# Patient Record
Sex: FEMALE | Race: WHITE | NOT HISPANIC OR LATINO | Employment: STUDENT | URBAN - METROPOLITAN AREA
[De-identification: names, ages, dates, MRNs, and addresses within clinical notes are randomized per-mention and may not be internally consistent; named-entity substitution may affect disease eponyms.]

---
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
## 2024-02-22 ENCOUNTER — OFFICE VISIT (OUTPATIENT)
Dept: PRIMARY CARE | Facility: CLINIC | Age: 19
End: 2024-02-22
Payer: COMMERCIAL

## 2024-02-22 VITALS
HEART RATE: 76 BPM | OXYGEN SATURATION: 98 % | SYSTOLIC BLOOD PRESSURE: 114 MMHG | DIASTOLIC BLOOD PRESSURE: 62 MMHG | BODY MASS INDEX: 26.55 KG/M2 | WEIGHT: 162 LBS

## 2024-02-22 DIAGNOSIS — K29.70 GASTRITIS WITHOUT BLEEDING, UNSPECIFIED CHRONICITY, UNSPECIFIED GASTRITIS TYPE: ICD-10-CM

## 2024-02-22 DIAGNOSIS — F90.2 ATTENTION DEFICIT HYPERACTIVITY DISORDER (ADHD), COMBINED TYPE: Primary | ICD-10-CM

## 2024-02-22 PROCEDURE — 1036F TOBACCO NON-USER: CPT | Performed by: FAMILY MEDICINE

## 2024-02-22 PROCEDURE — 99213 OFFICE O/P EST LOW 20 MIN: CPT | Performed by: FAMILY MEDICINE

## 2024-02-22 RX ORDER — METHYLPHENIDATE 15 MG/9H
1 PATCH TRANSDERMAL DAILY
Qty: 90 PATCH | Refills: 0 | Status: SHIPPED | OUTPATIENT
Start: 2024-02-22 | End: 2024-05-22

## 2024-02-22 ASSESSMENT — PAIN SCALES - GENERAL: PAINLEVEL: 0-NO PAIN

## 2024-02-22 NOTE — PROGRESS NOTES
Subjective   Patient ID: Gail Bryant is a 18 y.o. female who presents for Abdominal Pain (The pain start on Saturday 02-).    HPI    ABDOMINAL PAIN:  Sat am woke her andfelt like she had to have bowel movement but could not go  No nausea, no vomiting, appettite normal   Happens more often after alcohol intake 0- noticed with beer  She usually drinks alcoholic seltzers but noticed pain did come with weekends where she drank beer.  No fever or chills, no change in bowel movements.    ADD: Denies racing heart, headache, dizziness or nausea.  Sleeping well, no change in pattern or new insomnia  Appetite unchanged - weight stable.   OARRS:  I have personally reviewed the OARRS report for Gail Bryant. I have considered the risks of abuse, dependence, addiction and diversion    Is the patient prescribed a combination of a benzodiazepine and opioid?  No    Last Urine Drug Screen / ordered today: No  No results found for this or any previous visit (from the past 8760 hour(s)).  N/A  Clinical rationale for not completing a Urine Drug Screen:     Controlled Substance Agreement:  Date of the Last Agreement: due now  Reviewed Controlled Substance Agreement including but not limited to the benefits, risks, and alternatives to treatment with a Controlled Substance medication(s).  Reviewed Controlled Substance Agreement including but not limited to the benefits, risk, and alternatives to treatment with a Controlled Substance medication(s)     Stimulants:   What is the patient's goal of therapy? Maintain adequate attention during classes and to complete school work  Is this being achieved with current treatment? yes    Activities of Daily Living:   Is your overall impression that this patient is benefiting (symptom reduction outweighs side effects) from stimulant therapy? Yes     1. Physical Functioning: Better  2. Family Relationship: Better  3. Social Relationship: Better  4. Mood: Better  5. Sleep Patterns: Better  6.  Overall Function: Better        Review of Systems    Review of Systems negative except as noted in HPI and Chief complaint.     Objective                 /62 (BP Location: Left arm, Patient Position: Sitting, BP Cuff Size: Adult)   Pulse 76   Wt 73.5 kg (162 lb)   SpO2 98%   BMI 26.55 kg/m²      Physical Exam  Constitutional:       General: She is not in acute distress.     Appearance: Normal appearance. She is not ill-appearing.   HENT:      Head: Normocephalic and atraumatic.   Neck:      Vascular: No carotid bruit.   Cardiovascular:      Rate and Rhythm: Normal rate and regular rhythm.      Pulses: Normal pulses.      Heart sounds: Normal heart sounds. No murmur heard.     No gallop.   Pulmonary:      Effort: Pulmonary effort is normal.      Breath sounds: Normal breath sounds. No wheezing, rhonchi or rales.   Abdominal:      General: Abdomen is flat. Bowel sounds are normal.      Palpations: Abdomen is soft.   Musculoskeletal:      Cervical back: Normal range of motion and neck supple. No rigidity or tenderness.   Lymphadenopathy:      Cervical: No cervical adenopathy.   Skin:     General: Skin is warm and dry.   Neurological:      Mental Status: She is alert.   Psychiatric:         Mood and Affect: Mood normal.         Behavior: Behavior normal.         Assessment/Plan   Problem List Items Addressed This Visit       Attention deficit disorder (ADD) - Primary    Relevant Medications    Daytrana 15 mg/9 hr patch     Other Visit Diagnoses       Gastritis without bleeding, unspecified chronicity, unspecified gastritis type        Suspect GI intolerance to alcohol - possible gluten sensitivy with beer.  Avoiding  to see if any improvement. Call if not improving, consider GI referral.

## 2024-08-05 DIAGNOSIS — Z91.018 ALLERGY TO TREE NUTS: ICD-10-CM

## 2024-08-06 RX ORDER — EPINEPHRINE 0.3 MG/.3ML
INJECTION SUBCUTANEOUS
Qty: 2 EACH | Refills: 1 | Status: SHIPPED | OUTPATIENT
Start: 2024-08-06

## 2025-03-05 ENCOUNTER — PATIENT MESSAGE (OUTPATIENT)
Dept: PRIMARY CARE | Facility: CLINIC | Age: 20
End: 2025-03-05
Payer: COMMERCIAL

## 2025-03-05 DIAGNOSIS — F90.2 ATTENTION DEFICIT HYPERACTIVITY DISORDER (ADHD), COMBINED TYPE: ICD-10-CM

## 2025-03-06 RX ORDER — METHYLPHENIDATE 15 MG/9H
1 PATCH TRANSDERMAL DAILY
Qty: 90 PATCH | Refills: 0 | Status: SHIPPED | OUTPATIENT
Start: 2025-03-06 | End: 2025-06-04

## 2025-03-13 ENCOUNTER — APPOINTMENT (OUTPATIENT)
Dept: PRIMARY CARE | Facility: CLINIC | Age: 20
End: 2025-03-13
Payer: COMMERCIAL

## 2025-03-13 DIAGNOSIS — F90.2 ATTENTION DEFICIT HYPERACTIVITY DISORDER (ADHD), COMBINED TYPE: ICD-10-CM

## 2025-03-13 DIAGNOSIS — N92.0 MENORRHAGIA WITH REGULAR CYCLE: ICD-10-CM

## 2025-03-13 PROCEDURE — 99213 OFFICE O/P EST LOW 20 MIN: CPT | Performed by: FAMILY MEDICINE

## 2025-03-13 RX ORDER — METHYLPHENIDATE 1.1 MG/H
1 PATCH TRANSDERMAL DAILY
Qty: 90 PATCH | Refills: 0 | Status: SHIPPED | OUTPATIENT
Start: 2025-03-13

## 2025-03-13 RX ORDER — NORETHINDRONE ACETATE AND ETHINYL ESTRADIOL .03; 1.5 MG/1; MG/1
1 TABLET ORAL DAILY
Qty: 90 TABLET | Refills: 3 | Status: SHIPPED | OUTPATIENT
Start: 2025-03-13 | End: 2026-03-13

## 2025-03-13 NOTE — ASSESSMENT & PLAN NOTE
Stable, tolerating medications without side effect.  Continuing medications at current doses.  F/u at least annually for CPE.    Orders:    norethindrone ac-eth estradioL (Loestrin) 1.5-30 mg-mcg tablet tablet; Take 1 tablet by mouth once daily. Take continuously skipping placebo tablets.

## 2025-03-13 NOTE — ASSESSMENT & PLAN NOTE
Stable on current dosage.  Refilling ADD medications at current doses.  Reviewed policy for controlled substances - you are not to fill early or request refills early.  Any variance from policy could results in discontinuation of therapy and discharge from our care.  See signed Controlled Substance Contract forms which are scanned.  OARRS report reviewed and no suspicious activity noted.  Follow up visits every 3-6 months at minimum.   Orders:    methylphenidate (Daytrana) 10 mg/9 hr patch; Place 1 patch over 9 hours on the skin once daily. wear patch for 9 hours only each day

## 2025-03-13 NOTE — PROGRESS NOTES
Subjective   Patient ID: Gail Bryant is a 19 y.o. female who presents for Follow-up.    HPI    Virtual or Telephone Consent    An interactive audio and video telecommunication system which permits real time communications between the patient (at the originating site) and provider (at the distant site) was utilized to provide this telehealth service.   Verbal consent was requested and obtained from Gail Bryant on this date, 03/30/25 for a telehealth visit and the patient's location was confirmed at the time of the visit.     ADD: doing well on Daytrana - 15 mg patch but out of stock   Denies racing heart, headache, dizziness or nausea.  Sleeping well, no change in pattern or new insomnia  Appetite unchanged - weight stable.     OARRS:  No data recorded  I have personally reviewed the OARRS report for Gail Bryant. I have considered the risks of abuse, dependence, addiction and diversion    Is the patient prescribed a combination of a benzodiazepine and opioid?  No    Last Urine Drug Screen / ordered today: will schedule when she returns home from school  No results found for this or any previous visit (from the past 8760 hours).  N/A  Clinical rationale for not completing a Urine Drug Screen:     Controlled Substance Agreement:  Date of the Last Agreement: due - will follow up for signature when she is home  Reviewed Controlled Substance Agreement including but not limited to the benefits, risks, and alternatives to treatment with a Controlled Substance medication(s).  Reviewed Controlled Substance Agreement including but not limited to the benefits, risk, and alternatives to treatment with a Controlled Substance medication(s)     Stimulants:   What is the patient's goal of therapy? Maintain focus during classes and interviews  Is this being achieved with current treatment? yes    Activities of Daily Living:   Is your overall impression that this patient is benefiting (symptom reduction outweighs side effects)  from stimulant therapy? Yes     1. Physical Functioning: Better  2. Family Relationship: Better  3. Social Relationship: Better  4. Mood: Better  5. Sleep Patterns: Better  6. Overall Function: Better       MENSTRUAL SPOTTING: taking BCPs regularly  Has some spotting during her 2nd pack  She has had significant stress     Review of Systems    Review of Systems negative except as noted in HPI and Chief complaint.     Objective             VITALS:  There were no vitals taken for this visit.     Physical Exam    Assessment/Plan   Assessment & Plan  Attention deficit hyperactivity disorder (ADHD), combined type  Stable on current dosage.  Refilling ADD medications at current doses.  Reviewed policy for controlled substances - you are not to fill early or request refills early.  Any variance from policy could results in discontinuation of therapy and discharge from our care.  See signed Controlled Substance Contract forms which are scanned.  OARRS report reviewed and no suspicious activity noted.  Follow up visits every 3-6 months at minimum.   Orders:    methylphenidate (Daytrana) 10 mg/9 hr patch; Place 1 patch over 9 hours on the skin once daily. wear patch for 9 hours only each day    Menorrhagia with regular cycle  Stable, tolerating medications without side effect.  Continuing medications at current doses.  F/u at least annually for CPE.    Orders:    norethindrone ac-eth estradioL (Loestrin) 1.5-30 mg-mcg tablet tablet; Take 1 tablet by mouth once daily. Take continuously skipping placebo tablets.    FOLLOW UP 3 MONTHS, SOONER WITH ANY PROBLEMS OR CONCERNS.

## 2025-04-18 ENCOUNTER — PATIENT MESSAGE (OUTPATIENT)
Dept: PRIMARY CARE | Facility: CLINIC | Age: 20
End: 2025-04-18
Payer: COMMERCIAL

## 2025-05-15 ENCOUNTER — APPOINTMENT (OUTPATIENT)
Dept: PRIMARY CARE | Facility: CLINIC | Age: 20
End: 2025-05-15
Payer: COMMERCIAL

## 2025-05-15 VITALS
HEART RATE: 87 BPM | OXYGEN SATURATION: 98 % | BODY MASS INDEX: 25.36 KG/M2 | DIASTOLIC BLOOD PRESSURE: 71 MMHG | SYSTOLIC BLOOD PRESSURE: 127 MMHG | HEIGHT: 66 IN | WEIGHT: 157.8 LBS

## 2025-05-15 DIAGNOSIS — N92.0 MENORRHAGIA WITH REGULAR CYCLE: ICD-10-CM

## 2025-05-15 DIAGNOSIS — F90.2 ATTENTION DEFICIT HYPERACTIVITY DISORDER (ADHD), COMBINED TYPE: ICD-10-CM

## 2025-05-15 DIAGNOSIS — Z00.00 ANNUAL PHYSICAL EXAM: Primary | ICD-10-CM

## 2025-05-15 PROCEDURE — 99213 OFFICE O/P EST LOW 20 MIN: CPT | Performed by: FAMILY MEDICINE

## 2025-05-15 PROCEDURE — 3008F BODY MASS INDEX DOCD: CPT | Performed by: FAMILY MEDICINE

## 2025-05-15 ASSESSMENT — PATIENT HEALTH QUESTIONNAIRE - PHQ9
8. MOVING OR SPEAKING SO SLOWLY THAT OTHER PEOPLE COULD HAVE NOTICED. OR THE OPPOSITE, BEING SO FIGETY OR RESTLESS THAT YOU HAVE BEEN MOVING AROUND A LOT MORE THAN USUAL: NOT AT ALL
6. FEELING BAD ABOUT YOURSELF - OR THAT YOU ARE A FAILURE OR HAVE LET YOURSELF OR YOUR FAMILY DOWN: SEVERAL DAYS
3. TROUBLE FALLING OR STAYING ASLEEP OR SLEEPING TOO MUCH: NOT AT ALL
9. THOUGHTS THAT YOU WOULD BE BETTER OFF DEAD, OR OF HURTING YOURSELF: NOT AT ALL
2. FEELING DOWN, DEPRESSED OR HOPELESS: NOT AT ALL
7. TROUBLE CONCENTRATING ON THINGS, SUCH AS READING THE NEWSPAPER OR WATCHING TELEVISION: NOT AT ALL
10. IF YOU CHECKED OFF ANY PROBLEMS, HOW DIFFICULT HAVE THESE PROBLEMS MADE IT FOR YOU TO DO YOUR WORK, TAKE CARE OF THINGS AT HOME, OR GET ALONG WITH OTHER PEOPLE: NOT DIFFICULT AT ALL
1. LITTLE INTEREST OR PLEASURE IN DOING THINGS: NOT AT ALL
SUM OF ALL RESPONSES TO PHQ9 QUESTIONS 1 AND 2: 0
4. FEELING TIRED OR HAVING LITTLE ENERGY: NOT AT ALL
SUM OF ALL RESPONSES TO PHQ QUESTIONS 1-9: 1
5. POOR APPETITE OR OVEREATING: NOT AT ALL

## 2025-05-15 ASSESSMENT — ANXIETY QUESTIONNAIRES
6. BECOMING EASILY ANNOYED OR IRRITABLE: NOT AT ALL
1. FEELING NERVOUS, ANXIOUS, OR ON EDGE: SEVERAL DAYS
2. NOT BEING ABLE TO STOP OR CONTROL WORRYING: SEVERAL DAYS
3. WORRYING TOO MUCH ABOUT DIFFERENT THINGS: SEVERAL DAYS
IF YOU CHECKED OFF ANY PROBLEMS ON THIS QUESTIONNAIRE, HOW DIFFICULT HAVE THESE PROBLEMS MADE IT FOR YOU TO DO YOUR WORK, TAKE CARE OF THINGS AT HOME, OR GET ALONG WITH OTHER PEOPLE: NOT DIFFICULT AT ALL
4. TROUBLE RELAXING: SEVERAL DAYS
7. FEELING AFRAID AS IF SOMETHING AWFUL MIGHT HAPPEN: SEVERAL DAYS
5. BEING SO RESTLESS THAT IT IS HARD TO SIT STILL: NOT AT ALL
GAD7 TOTAL SCORE: 5

## 2025-05-15 NOTE — PROGRESS NOTES
"Subjective     Patient ID: Gail Bryant is a 20 y.o. female who presents for Follow-up.    HPI     History of Present Illness  Gail Bryant is a 20 year old female who presents with concerns about eczema and menstrual irregularities.    She has been experiencing severe eczema since December, initially misdiagnosed as ringworm. A biopsy confirmed the diagnosis of severe eczema. She has been using clobetasol cream and received 10 to 12 injections in her back, which have improved her condition, although she still experiences occasional flare-ups.    She is concerned about a reddish discharge occurring mid-cycle while on a higher dose of birth control pills. Her periods are otherwise normal, occurring every nine weeks, lasting five to six days, and are heavy. She experiences minimal cramps, which are manageable compared to before. She previously tried a lower dosage of birth control, which did not alter her period significantly.    She mentions she was prescribed Daytrana but did not receive it. She reports good academic performance without it, although she notices some days where she could benefit from it. She prefers a lower dose of 10 mg due to side effects like reduced appetite and mood changes when using the medication.    No headaches, heart rate issues, diarrhea, constipation, or excessive heartburn. She reports good appetite and sleep, with a recent weight loss of a pant size. 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 She is not a smoker and leans off red meat in her diet.    ECZEMA: saw dermatology - biopsy negative for Tiea  Now on Clobetasol and injections    Review of Systems  Review of Systems negative except as noted in HPI and Chief complaint.     Objective     VITALS:  /71   Pulse 87   Ht 1.67 m (5' 5.75\")   Wt 71.6 kg (157 lb 12.8 oz)   SpO2 98%   BMI 25.66 kg/m²     Physical " Exam    Assessment & Plan  Annual physical exam         Menorrhagia with regular cycle         Attention deficit hyperactivity disorder (ADHD), combined type              Assessment & Plan  Severe eczema  Severe eczema confirmed by biopsy, initially suspected as ringworm. The condition causes bleeding from scratching. Treatment with clobetasol and injections has improved symptoms, though flare-ups persist. Ronidazole has been effective in symptom management.  - Continue clobetasol treatment.  - Monitor and manage flare-ups with current regimen.  - Consider Ronidazole for symptom management as needed.    Intermenstrual spotting on oral contraceptive  Intermenstrual spotting on higher dose oral contraceptive. Periods are regular with manageable cramps. Discussed switching to NuvaRing for localized hormone delivery to control spotting. Explained insertion process and potential localized effects. NuvaRing is covered by insurance.  - Switch to NuvaRing after completing current pill pack.  - Monitor for changes in spotting and menstrual cycle.  - If NuvaRing is not tolerated, consider returning to oral contraceptive with a break for a period.  - Consider GYN referral if spotting persists.    Attention deficit hyperactivity disorder (ADHD), combined type  ADHD managed with Methylphenidate (Daytrana) 10 mg transdermal. She reports infrequent use due to good academic performance without it. Experiences decreased appetite and mood changes with medication. Prefers a lower dose due to side effects.  - Prescribe Methylphenidate (Daytrana) 10 mg transdermal for use as needed.  - Send prescription to local pharmacy.  - Consider reducing dose to minimize side effects.    Recording duration: 16 minutes         FOLLOW UP in the next 3 months for CPE.    Pricila Espino DO 05/15/25 3:24 PM    This medical note was created with the assistance of artificial intelligence (AI) for documentation purposes. The content has been reviewed and  confirmed by the healthcare provider for accuracy and completeness. Patient consented to the use of audio recording and use of AI during their visit.

## 2025-06-01 RX ORDER — ETONOGESTREL AND ETHINYL ESTRADIOL VAGINAL RING .015; .12 MG/D; MG/D
1 RING VAGINAL
Qty: 3 EACH | Refills: 3 | Status: SHIPPED | OUTPATIENT
Start: 2025-06-01 | End: 2026-06-01

## 2025-06-01 RX ORDER — METHYLPHENIDATE 1.1 MG/H
1 PATCH TRANSDERMAL DAILY
Qty: 90 PATCH | Refills: 0 | Status: SHIPPED | OUTPATIENT
Start: 2025-06-01

## 2025-08-15 ENCOUNTER — APPOINTMENT (OUTPATIENT)
Dept: PRIMARY CARE | Facility: CLINIC | Age: 20
End: 2025-08-15
Payer: COMMERCIAL

## 2025-08-18 ENCOUNTER — APPOINTMENT (OUTPATIENT)
Dept: PRIMARY CARE | Facility: CLINIC | Age: 20
End: 2025-08-18
Payer: COMMERCIAL

## 2025-08-18 ASSESSMENT — ANXIETY QUESTIONNAIRES
5. BEING SO RESTLESS THAT IT IS HARD TO SIT STILL: NOT AT ALL
6. BECOMING EASILY ANNOYED OR IRRITABLE: SEVERAL DAYS
IF YOU CHECKED OFF ANY PROBLEMS ON THIS QUESTIONNAIRE, HOW DIFFICULT HAVE THESE PROBLEMS MADE IT FOR YOU TO DO YOUR WORK, TAKE CARE OF THINGS AT HOME, OR GET ALONG WITH OTHER PEOPLE: NOT DIFFICULT AT ALL
2. NOT BEING ABLE TO STOP OR CONTROL WORRYING: SEVERAL DAYS
GAD7 TOTAL SCORE: 4
1. FEELING NERVOUS, ANXIOUS, OR ON EDGE: SEVERAL DAYS
4. TROUBLE RELAXING: NOT AT ALL
3. WORRYING TOO MUCH ABOUT DIFFERENT THINGS: SEVERAL DAYS
7. FEELING AFRAID AS IF SOMETHING AWFUL MIGHT HAPPEN: NOT AT ALL

## 2025-08-18 ASSESSMENT — PATIENT HEALTH QUESTIONNAIRE - PHQ9
4. FEELING TIRED OR HAVING LITTLE ENERGY: NOT AT ALL
1. LITTLE INTEREST OR PLEASURE IN DOING THINGS: NOT AT ALL
5. POOR APPETITE OR OVEREATING: NOT AT ALL
SUM OF ALL RESPONSES TO PHQ QUESTIONS 1-9: 2
7. TROUBLE CONCENTRATING ON THINGS, SUCH AS READING THE NEWSPAPER OR WATCHING TELEVISION: SEVERAL DAYS
2. FEELING DOWN, DEPRESSED OR HOPELESS: NOT AT ALL
SUM OF ALL RESPONSES TO PHQ9 QUESTIONS 1 AND 2: 0
6. FEELING BAD ABOUT YOURSELF - OR THAT YOU ARE A FAILURE OR HAVE LET YOURSELF OR YOUR FAMILY DOWN: NOT AT ALL
3. TROUBLE FALLING OR STAYING ASLEEP OR SLEEPING TOO MUCH: SEVERAL DAYS
9. THOUGHTS THAT YOU WOULD BE BETTER OFF DEAD, OR OF HURTING YOURSELF: NOT AT ALL
8. MOVING OR SPEAKING SO SLOWLY THAT OTHER PEOPLE COULD HAVE NOTICED. OR THE OPPOSITE, BEING SO FIGETY OR RESTLESS THAT YOU HAVE BEEN MOVING AROUND A LOT MORE THAN USUAL: NOT AT ALL